# Patient Record
Sex: FEMALE | Race: WHITE
[De-identification: names, ages, dates, MRNs, and addresses within clinical notes are randomized per-mention and may not be internally consistent; named-entity substitution may affect disease eponyms.]

---

## 2018-09-28 ENCOUNTER — HOSPITAL ENCOUNTER (INPATIENT)
Dept: HOSPITAL 68 - GNO | Age: 33
LOS: 1 days | End: 2018-09-29
Attending: OBSTETRICS & GYNECOLOGY | Admitting: OBSTETRICS & GYNECOLOGY
Payer: COMMERCIAL

## 2018-09-28 VITALS — BODY MASS INDEX: 23.54 KG/M2 | HEIGHT: 67 IN | WEIGHT: 150 LBS

## 2018-09-28 DIAGNOSIS — Z3A.40: ICD-10-CM

## 2018-09-28 DIAGNOSIS — Z88.0: ICD-10-CM

## 2018-09-28 DIAGNOSIS — Z88.1: ICD-10-CM

## 2018-09-28 LAB
ABSOLUTE GRANULOCYTE CT: 7 /CUMM (ref 1.4–6.5)
BASOPHILS # BLD: 0 /CUMM (ref 0–0.2)
BASOPHILS NFR BLD: 0.3 % (ref 0–2)
EOSINOPHIL # BLD: 0.1 /CUMM (ref 0–0.7)
EOSINOPHIL NFR BLD: 1.2 % (ref 0–5)
ERYTHROCYTE [DISTWIDTH] IN BLOOD BY AUTOMATED COUNT: 14.2 % (ref 11.5–14.5)
GRANULOCYTES NFR BLD: 78.5 % (ref 42.2–75.2)
HCT VFR BLD CALC: 34.5 % (ref 37–47)
LYMPHOCYTES # BLD: 1.5 /CUMM (ref 1.2–3.4)
MCH RBC QN AUTO: 32 PG (ref 27–31)
MCHC RBC AUTO-ENTMCNC: 34.8 G/DL (ref 33–37)
MCV RBC AUTO: 91.9 FL (ref 81–99)
MONOCYTES # BLD: 0.3 /CUMM (ref 0.1–0.6)
PLATELET # BLD: 257 /CUMM (ref 130–400)
PMV BLD AUTO: 8.4 FL (ref 7.4–10.4)
RED BLOOD CELL CT: 3.76 /CUMM (ref 4.2–5.4)
WBC # BLD AUTO: 9 /CUMM (ref 4.8–10.8)

## 2018-09-28 NOTE — HISTORY & PHYSICAL PRE-OP
General Information and HPI
History of Present Illness:
33YO  AT 40W4D FOR IOL POSTDATES.  PRENATAL CARE COMPLETE AND REMARKABLE.
 
Allergies/Medications
Allergies:
Coded Allergies:
penicillin G (Mild, NAUSEA 18)
azithromycin (NAUSEA 18)
 
 
Past History
 
Medical History
Isolation History: Standard
 
Surgical History
Pertinent Surgical History: none
 
Past Family/Social History
 
Psychosocial History
Smoking Status: Never Smoked
 
Review of Systems
 
Review of Systems
Constitutional:
Reports: no symptoms. 
EENTM:
Reports: no symptoms. 
Cardiovascular:
Reports: no symptoms. 
Respiratory:
Reports: no symptoms. 
GI:
Reports: no symptoms. 
Genitourinary:
Reports: no symptoms. 
Musculoskeletal:
Reports: no symptoms. 
Skin:
Reports: no symptoms. 
Neurological/Psychological:
Reports: no symptoms. 
Hematologic/Endocrine:
Reports: no symptoms. 
Immunologic/Allergic:
Reports: no symptoms. 
All Other Systems: Reviewed and Negative
 
Exam & Diagnostic Data
Last 24 Hrs of Vital Signs/I&O
 Intake & Output
 
 
  1600  0800  0000
 
Intake Total   
 
Output Total   
 
Balance   
 
    
 
Patient 150 lb  
 
Weight   
 
 
 
Physical Exam:
HEENT: NCAT
CHEST: CTA
CV: NL S1S2
ABD: GRAVID, CEPHALIC, EFW 7
EXT:  NO C/C/E
 
Assessment/Plan
Assessment/Plan:
POSTDATES 
PITOCIN IOL
 
As Ranked By This Provider
Problem List:
 1. Pregnancy

## 2018-09-28 NOTE — LABOR & DELIVERY SUMMARY
Delivery Summary
Vaginal Delivery:
   Vaginal: vertex
Episiotomy/Lacerations:
   Episiotomy/Lacerations: none
Placenta:
   Placenta: spontanteous, normal, 3 vessel
Baby's Weight:
7/3
Apgars - 1 Min: 9
Apgars - 5 Min: 9

## 2018-09-29 LAB
ABSOLUTE GRANULOCYTE CT: 10.2 /CUMM (ref 1.4–6.5)
BASOPHILS # BLD: 0 /CUMM (ref 0–0.2)
BASOPHILS NFR BLD: 0.1 % (ref 0–2)
EOSINOPHIL # BLD: 0.1 /CUMM (ref 0–0.7)
EOSINOPHIL NFR BLD: 0.8 % (ref 0–5)
ERYTHROCYTE [DISTWIDTH] IN BLOOD BY AUTOMATED COUNT: 13.7 % (ref 11.5–14.5)
GRANULOCYTES NFR BLD: 78.9 % (ref 42.2–75.2)
HCT VFR BLD CALC: 33.4 % (ref 37–47)
LYMPHOCYTES # BLD: 2.1 /CUMM (ref 1.2–3.4)
MCH RBC QN AUTO: 30.8 PG (ref 27–31)
MCHC RBC AUTO-ENTMCNC: 34.2 G/DL (ref 33–37)
MCV RBC AUTO: 90.1 FL (ref 81–99)
MONOCYTES # BLD: 0.5 /CUMM (ref 0.1–0.6)
PLATELET # BLD: 213 /CUMM (ref 130–400)
PMV BLD AUTO: 7.9 FL (ref 7.4–10.4)
RED BLOOD CELL CT: 3.71 /CUMM (ref 4.2–5.4)
WBC # BLD AUTO: 12.9 /CUMM (ref 4.8–10.8)